# Patient Record
Sex: MALE | Race: ASIAN
[De-identification: names, ages, dates, MRNs, and addresses within clinical notes are randomized per-mention and may not be internally consistent; named-entity substitution may affect disease eponyms.]

---

## 2019-05-31 ENCOUNTER — HOSPITAL ENCOUNTER (INPATIENT)
Dept: HOSPITAL 62 - ER | Age: 29
LOS: 2 days | Discharge: HOME | DRG: 390 | End: 2019-06-02
Attending: SURGERY | Admitting: SURGERY
Payer: SELF-PAY

## 2019-05-31 DIAGNOSIS — Z87.891: ICD-10-CM

## 2019-05-31 DIAGNOSIS — K56.690: Primary | ICD-10-CM

## 2019-05-31 LAB
ADD MANUAL DIFF: NO
ALBUMIN SERPL-MCNC: 5.1 G/DL (ref 3.5–5)
ALP SERPL-CCNC: 86 U/L (ref 38–126)
ALT SERPL-CCNC: 25 U/L (ref 21–72)
ANION GAP SERPL CALC-SCNC: 15 MMOL/L (ref 5–19)
APPEARANCE UR: (no result)
APTT PPP: (no result) S
AST SERPL-CCNC: 22 U/L (ref 17–59)
BASOPHILS # BLD AUTO: 0.1 10^3/UL (ref 0–0.2)
BASOPHILS NFR BLD AUTO: 0.4 % (ref 0–2)
BILIRUB DIRECT SERPL-MCNC: 0.4 MG/DL (ref 0–0.4)
BILIRUB SERPL-MCNC: 0.6 MG/DL (ref 0.2–1.3)
BILIRUB UR QL STRIP: NEGATIVE
BUN SERPL-MCNC: 12 MG/DL (ref 7–20)
CALCIUM: 10.4 MG/DL (ref 8.4–10.2)
CHLORIDE SERPL-SCNC: 99 MMOL/L (ref 98–107)
CO2 SERPL-SCNC: 26 MMOL/L (ref 22–30)
EOSINOPHIL # BLD AUTO: 0 10^3/UL (ref 0–0.6)
EOSINOPHIL NFR BLD AUTO: 0.1 % (ref 0–6)
ERYTHROCYTE [DISTWIDTH] IN BLOOD BY AUTOMATED COUNT: 12.9 % (ref 11.5–14)
GLUCOSE SERPL-MCNC: 121 MG/DL (ref 75–110)
GLUCOSE UR STRIP-MCNC: NEGATIVE MG/DL
HCT VFR BLD CALC: 51.6 % (ref 37.9–51)
HGB BLD-MCNC: 17.9 G/DL (ref 13.5–17)
KETONES UR STRIP-MCNC: (no result) MG/DL
LIPASE SERPL-CCNC: 49.8 U/L (ref 23–300)
LYMPHOCYTES # BLD AUTO: 1.6 10^3/UL (ref 0.5–4.7)
LYMPHOCYTES NFR BLD AUTO: 11.1 % (ref 13–45)
MCH RBC QN AUTO: 32.1 PG (ref 27–33.4)
MCHC RBC AUTO-ENTMCNC: 34.6 G/DL (ref 32–36)
MCV RBC AUTO: 93 FL (ref 80–97)
MONOCYTES # BLD AUTO: 0.8 10^3/UL (ref 0.1–1.4)
MONOCYTES NFR BLD AUTO: 5.4 % (ref 3–13)
NEUTROPHILS # BLD AUTO: 11.7 10^3/UL (ref 1.7–8.2)
NEUTS SEG NFR BLD AUTO: 83 % (ref 42–78)
NITRITE UR QL STRIP: NEGATIVE
PH UR STRIP: 5 [PH] (ref 5–9)
PLATELET # BLD: 378 10^3/UL (ref 150–450)
POTASSIUM SERPL-SCNC: 4.4 MMOL/L (ref 3.6–5)
PROT SERPL-MCNC: 8.1 G/DL (ref 6.3–8.2)
PROT UR STRIP-MCNC: 30 MG/DL
RBC # BLD AUTO: 5.56 10^6/UL (ref 4.35–5.55)
SODIUM SERPL-SCNC: 139.8 MMOL/L (ref 137–145)
SP GR UR STRIP: 1.03
TOTAL CELLS COUNTED % (AUTO): 100 %
UROBILINOGEN UR-MCNC: NEGATIVE MG/DL (ref ?–2)
WBC # BLD AUTO: 14.1 10^3/UL (ref 4–10.5)

## 2019-05-31 PROCEDURE — 74250 X-RAY XM SM INT 1CNTRST STD: CPT

## 2019-05-31 PROCEDURE — 74022 RADEX COMPL AQT ABD SERIES: CPT

## 2019-05-31 PROCEDURE — 96361 HYDRATE IV INFUSION ADD-ON: CPT

## 2019-05-31 PROCEDURE — 80053 COMPREHEN METABOLIC PANEL: CPT

## 2019-05-31 PROCEDURE — 80048 BASIC METABOLIC PNL TOTAL CA: CPT

## 2019-05-31 PROCEDURE — 74177 CT ABD & PELVIS W/CONTRAST: CPT

## 2019-05-31 PROCEDURE — 96375 TX/PRO/DX INJ NEW DRUG ADDON: CPT

## 2019-05-31 PROCEDURE — 74018 RADEX ABDOMEN 1 VIEW: CPT

## 2019-05-31 PROCEDURE — 81001 URINALYSIS AUTO W/SCOPE: CPT

## 2019-05-31 PROCEDURE — 99285 EMERGENCY DEPT VISIT HI MDM: CPT

## 2019-05-31 PROCEDURE — 0D9670Z DRAINAGE OF STOMACH WITH DRAINAGE DEVICE, VIA NATURAL OR ARTIFICIAL OPENING: ICD-10-PCS

## 2019-05-31 PROCEDURE — 85025 COMPLETE CBC W/AUTO DIFF WBC: CPT

## 2019-05-31 PROCEDURE — 36415 COLL VENOUS BLD VENIPUNCTURE: CPT

## 2019-05-31 PROCEDURE — 96374 THER/PROPH/DIAG INJ IV PUSH: CPT

## 2019-05-31 PROCEDURE — 71045 X-RAY EXAM CHEST 1 VIEW: CPT

## 2019-05-31 PROCEDURE — 83605 ASSAY OF LACTIC ACID: CPT

## 2019-05-31 PROCEDURE — 83690 ASSAY OF LIPASE: CPT

## 2019-05-31 RX ADMIN — MORPHINE SULFATE PRN MG: 10 INJECTION INTRAMUSCULAR; INTRAVENOUS; SUBCUTANEOUS at 20:23

## 2019-05-31 RX ADMIN — DEXTROSE, SODIUM CHLORIDE, SODIUM LACTATE, POTASSIUM CHLORIDE, AND CALCIUM CHLORIDE PRN MLS/HR: 5; .6; .31; .03; .02 INJECTION, SOLUTION INTRAVENOUS at 23:12

## 2019-05-31 RX ADMIN — DEXTROSE, SODIUM CHLORIDE, SODIUM LACTATE, POTASSIUM CHLORIDE, AND CALCIUM CHLORIDE PRN MLS/HR: 5; .6; .31; .03; .02 INJECTION, SOLUTION INTRAVENOUS at 16:22

## 2019-05-31 RX ADMIN — MORPHINE SULFATE PRN MG: 10 INJECTION INTRAMUSCULAR; INTRAVENOUS; SUBCUTANEOUS at 23:58

## 2019-05-31 RX ADMIN — MORPHINE SULFATE PRN MG: 10 INJECTION INTRAMUSCULAR; INTRAVENOUS; SUBCUTANEOUS at 16:22

## 2019-05-31 NOTE — RADIOLOGY REPORT (SQ)
EXAM DESCRIPTION:  CT ABD/PELVIS WITH IV   ORAL



COMPLETED DATE/TIME:  5/31/2019 10:10 am



REASON FOR STUDY:  SBO



COMPARISON:  None.



TECHNIQUE:  CT scan of the abdomen and pelvis performed with intravenous and oral contrast using blank
lelo scanning technique with dynamic intravenous contrast injection. Images reviewed with lung, soft t
issue, and bone windows. Reconstructed coronal and sagittal MPR images reviewed. Delayed images for e
valuation of the urinary system also acquired. All images stored on PACS.

All CT scanners at this facility use dose modulation, iterative reconstruction, and/or weight based d
osing when appropriate to reduce radiation dose to as low as reasonably achievable (ALARA).

[facility]: Dose Right  [facility]: CareDose    [facility]: Dose Right    [facility]: Teradose 4D    [facility]: Smart Technologies



CONTRAST TYPE AND DOSE:  contrast/concentration: Isovue 350.00 mg/ml; Total Contrast Delivered: 87.0 
ml; Total Saline Delivered: 69.0 ml



RENAL FUNCTION:  GFR > 60.



RADIATION DOSE:  CT Rad equipment meets quality standard of care and radiation dose reduction techniq
ues were employed. CTDIvol: NaN - NaN mGy. DLP: 0 mGy-cm. .



LIMITATIONS:  None.



FINDINGS:  LOWER CHEST: No significant findings. No nodules or infiltrates.

LIVER: Normal size. No masses.  No dilated ducts.

SPLEEN: Normal size. No focal lesions.

PANCREAS: No masses. No significant calcifications. No adjacent inflammation or peripancreatic fluid 
collections. Pancreatic duct not dilated.

GALLBLADDER: No identified stones by CT criteria. No inflammatory changes to suggest cholecystitis.

ADRENAL GLANDS: No significant masses or asymmetry.

RIGHT KIDNEY AND URETER: No solid masses.   No significant calcifications.   No hydronephrosis or hyd
roureter.

LEFT KIDNEY AND URETER: No solid masses.   No significant calcifications.   No hydronephrosis or hydr
oureter.

AORTA AND VESSELS: See below.

RETROPERITONEUM: No retroperitoneal adenopathy, hemorrhage or masses.

BOWEL AND PERITONEAL CAVITY: Dilated loops of small bowel with gas fluid levels.  No clear transition
 point.  Distal duodenum is not visualized to left of midline.  SMA is to the right of the SMV.  No a
scites or free air.

APPENDIX: Not visualized.

PELVIS: Small amount of free fluid.

ABDOMINAL WALL: No masses. No hernias.

BONES: No significant or acute findings.

OTHER: No other significant finding.



IMPRESSION:  Partial small bowel obstruction associated with intestinal malrotation.



COMMENT:  Findings were called to Dr. Diamond.



TECHNICAL DOCUMENTATION:  JOB ID:  [id number]

Quality ID # [id number]: Final reports with documentation of one or more dose reduction techniques (e.g., Au
tomated exposure control, adjustment of the mA and/or kV according to patient size, use of iterative 
reconstruction technique)

 2011 [employer]- All Rights Reserved



Reading location - IP/workstation name: [email or web address]

## 2019-05-31 NOTE — RADIOLOGY REPORT (SQ)
EXAM DESCRIPTION:  CHEST SINGLE VIEW



COMPLETED DATE/TIME:  5/31/2019 11:22 am



REASON FOR STUDY:  NG tube placement confirmation



COMPARISON:  None.



EXAM PARAMETERS:  NUMBER OF VIEWS: One view.

TECHNIQUE: Single frontal radiographic view of the upper abdomen and lower chest acquired.

RADIATION DOSE: NA

LIMITATIONS: None.



FINDINGS:  Nasogastric tube tip overlies the stomach.



IMPRESSION:  Nasogastric tube in the stomach.



TECHNICAL DOCUMENTATION:  JOB ID:  [id number]

 2011 [employer]- All Rights Reserved



Reading location - IP/workstation name: [email or web address]

## 2019-05-31 NOTE — PDOC H&P
History of Present Illness


Admission Date/PCP: 


  19 10:46





  





Patient complains of: abdominal pains


History of Present Illness: 


GUNJAN KYLE is a 29 year old malewith history of gastroschisis when  c/o

abdomonal pains 2 days ago associated with nausea. Today pains worsened and went

to ED. Had CT scan of abd/pelvis which showed partial SBO. He had a small BM and

flatus this am.. Denies fever/chills. Pains are usually episodic/crampy.








Past Surgical History


Past Surgical History: Reports: Other - surgery for gastroschisis as a  

in the [facility].





Social History


Smoking Status: Former Smoker


Last Time Smoked: 2019


Frequency of Alcohol Use: Occasional


Hx Recreational Drug Use: No


Hx Prescription Drug Abuse: No





- Advance Directive


Resuscitation Status: Full Code





Family History


Family History: Reviewed & Not Pertinent


Parental Family History Reviewed: Yes


Children Family History Reviewed: No


Sibling(s) Family History Reviewed.: No





Medication/Allergy


Home Medications: 








No Home Medications  19 








Allergies/Adverse Reactions: 


                                        





No Known Allergies Allergy (Unverified 19 06:28)


   











Review of Systems


Constitutional: PRESENT: as per HPI


Eyes: PRESENT: other - no visual/hearing changes


Cardiovascular: PRESENT: other - no chest pains/cough


Gastrointestinal: PRESENT: abdominal pain, nausea





Physical Exam


Vital Signs: 


                                        











Temp Pulse Resp BP Pulse Ox


 


 98.0 F   86   15   145/96 H  96 


 


 19 13:18  19 13:18  19 13:18  19 13:18  19 13:18








                                 Intake & Output











 19





 06:59 06:59 06:59


 


Intake Total   3000


 


Balance   3000


 


Weight  76.5 kg 76.4 kg











General appearance: PRESENT: severe distress


Head exam: PRESENT: atraumatic


Eye exam: PRESENT: conjunctiva pink


Mouth exam: PRESENT: moist


Neck exam: PRESENT: full ROM


Respiratory exam: PRESENT: clear to auscultation moriah


Cardiovascular exam: PRESENT: tachycardia


Pulses: PRESENT: normal radial pulses


Vascular exam: PRESENT: normal capillary refill


GI/Abdominal exam: PRESENT: soft, tenderness - epigastric area


Rectal exam: PRESENT: deferred


Extremities exam: PRESENT: full ROM


Musculoskeletal exam: PRESENT: ambulatory


Neurological exam: PRESENT: alert, oriented to person, oriented to place, 

oriented to time, oriented to situation


Psychiatric exam: PRESENT: appropriate affect


Skin exam: PRESENT: normal color, warm





Results


Laboratory Results: 


                                        





                                 19 06:35 





                                 19 06:35 





                                        











  19





  06:35 06:35 06:35


 


WBC  14.1 H  


 


RBC  5.56 H  


 


Hgb  17.9 H  


 


Hct  51.6 H  


 


MCV  93  


 


MCH  32.1  


 


MCHC  34.6  


 


RDW  12.9  


 


Plt Count  378  


 


Seg Neutrophils %  83.0 H  


 


Lymphocytes %  11.1 L  


 


Monocytes %  5.4  


 


Eosinophils %  0.1  


 


Basophils %  0.4  


 


Absolute Neutrophils  11.7 H  


 


Absolute Lymphocytes  1.6  


 


Absolute Monocytes  0.8  


 


Absolute Eosinophils  0.0  


 


Absolute Basophils  0.1  


 


Sodium   139.8 


 


Potassium   4.4 


 


Chloride   99 


 


Carbon Dioxide   26 


 


Anion Gap   15 


 


BUN   12 


 


Creatinine   0.79 


 


Est GFR ( Amer)   > 60 


 


Est GFR (Non-Af Amer)   > 60 


 


Glucose   121 H 


 


Lactic Acid    1.5


 


Calcium   10.4 H 


 


Total Bilirubin   0.6 


 


AST   22 


 


ALT   25 


 


Alkaline Phosphatase   86 


 


Total Protein   8.1 


 


Albumin   5.1 H 


 


Lipase   49.8 


 


Urine Color   


 


Urine Appearance   


 


Urine pH   


 


Ur Specific Gravity   


 


Urine Protein   


 


Urine Glucose (UA)   


 


Urine Ketones   


 


Urine Blood   


 


Urine Nitrite   


 


Ur Leukocyte Esterase   


 


Urine WBC (Auto)   


 


Urine RBC (Auto)   














  19





  07:40


 


WBC 


 


RBC 


 


Hgb 


 


Hct 


 


MCV 


 


MCH 


 


MCHC 


 


RDW 


 


Plt Count 


 


Seg Neutrophils % 


 


Lymphocytes % 


 


Monocytes % 


 


Eosinophils % 


 


Basophils % 


 


Absolute Neutrophils 


 


Absolute Lymphocytes 


 


Absolute Monocytes 


 


Absolute Eosinophils 


 


Absolute Basophils 


 


Sodium 


 


Potassium 


 


Chloride 


 


Carbon Dioxide 


 


Anion Gap 


 


BUN 


 


Creatinine 


 


Est GFR ( Amer) 


 


Est GFR (Non-Af Amer) 


 


Glucose 


 


Lactic Acid 


 


Calcium 


 


Total Bilirubin 


 


AST 


 


ALT 


 


Alkaline Phosphatase 


 


Total Protein 


 


Albumin 


 


Lipase 


 


Urine Color  WILBERTO


 


Urine Appearance  SLIGHTLY-CLOUDY


 


Urine pH  5.0


 


Ur Specific Gravity  1.030


 


Urine Protein  30 H


 


Urine Glucose (UA)  NEGATIVE


 


Urine Ketones  TRACE H


 


Urine Blood  NEGATIVE


 


Urine Nitrite  NEGATIVE


 


Ur Leukocyte Esterase  NEGATIVE


 


Urine WBC (Auto)  1


 


Urine RBC (Auto)  2











Impressions: 


                                        





Chest X-Ray  19 00:00


IMPRESSION:  Nasogastric tube in the stomach.


 








Acute Abdomen Series  19 06:29


IMPRESSION:


 


1.  No evidence of acute intrathoracic disease..


2.  Findings suspicious for a mid to distal small bowel


obstruction.


3. Suspect possible hepatomegaly.


 








Abdomen/Pelvis CT  19 07:23


IMPRESSION:  Partial small bowel obstruction associated with intestinal 

malrotation.


 














Assessment & Plan





- Diagnosis








(2) Abdominal pain


Qualifiers: 


   Abdominal location: generalized   Qualified Code(s): R10.84 - Generalized 

abdominal pain   


Is this a current diagnosis for this admission?: Yes   





(3) Congenital malrotation of intestine


Is this a current diagnosis for this admission?: Yes   





(4) Partial small bowel obstruction


Is this a current diagnosis for this admission?: Yes   





- Time


Time Spent: 30 to 50 Minutes





- Inpatient Certification


Medical Necessity: Need For IV Fluids, Need for Pain Control, Need for Surgery, 

Risk of Complication if Not Cared For in Hospital





- Plan Summary


Plan Summary: 





Just ordered SBFT to see area of transition and/or relieve partial obstruction


Keep NGT


Re-evaluate post SBFT

## 2019-05-31 NOTE — RADIOLOGY REPORT (SQ)
EXAM:



X-ray abdomen supine and erect with chest



CLINICAL DATA:



29-year-old male with abdominal pain



TECHNICAL DATA: 



Three x-ray images of the chest and abdomen were performed

including a PA radiograph of the chest as well as supine and

upright views of the abdomen. This study was performed on

5/31/2019 at 6:49 AM.



Comparison:  None.



FINDINGS:



The lungs are well expanded. The cardiac silhouette is within

normal limits. There is no focal consolidation, pleural effusion

or pneumothorax. The costophrenic sulci are clear.



There are multiple borderline dilated air-filled loops of small

bowel demonstrating differential air-fluid levels on the upright

projection. There is no evidence of free intraperitoneal air.

There does appear to be air and fecal residue in the region of

the rectum. Overall, there is a paucity of air in the colon.

Overall findings are concerning for a mid to distal small bowel

obstruction. No pathologic abdominal or pelvic calcifications are

identified. The psoas margins are preserved. There appears to be

elongation of the liver suggesting possible hepatomegaly. No

acute osseous abnormalities are identified.



IMPRESSION:



1.  No evidence of acute intrathoracic disease..

2.  Findings suspicious for a mid to distal small bowel

obstruction.

3. Suspect possible hepatomegaly.

## 2019-05-31 NOTE — ER DOCUMENT REPORT
Entered by MICHAEL SCHMID SCRIBE  05/31/19 0636 





Acting as scribe for:ARMANI HAYNES MD





ED General





- General


Chief Complaint: Abdominal Pain


Stated Complaint: STOMACH PAIN


Time Seen by Provider: 05/31/19 06:11


Mode of Arrival: Ambulatory


Information source: Patient


Notes: 


Patient is a  29 year old male with a history of gastroschisis repair presents 

to the emergency department complaining of abdominal pain and nausea onset 2 

days ago. Patient states the pain is located around is old surgical site and 

describes the pain as "extreme" that is exacerbated with coughing and walking. 

He states he has not eaten in the last 24 hours. He also reports a normal, small

bowel movement before arrival to the emergency department. He denies any 

vomiting or fevers.   


TRAVEL OUTSIDE OF THE [place] IN LAST 30 DAYS: No





- Related Data


Allergies/Adverse Reactions: 


                                        





No Known Allergies Allergy (Unverified 05/31/19 06:28)


   











Past Medical History





- General


Information source: Patient





- Social History


Smoking Status: Current Some Day Smoker


Cigarette use (# per day): Yes


Chew tobacco use (# tins/day): No


Frequency of alcohol use: None


Drug Abuse: None


Occupation: Cellphone technician


Family History: Reviewed & Not Pertinent


Patient has suicidal ideation: No


Patient has homicidal ideation: No


Past Surgical History: Reports: Hx Abdominal Surgery - gastroschisis repair as 

infant





Review of Systems





- Review of Systems


Constitutional: No symptoms reported


EENT: No symptoms reported


Cardiovascular: No symptoms reported


Respiratory: No symptoms reported


Gastrointestinal: See HPI, Abdominal pain, Nausea


Genitourinary: No symptoms reported


Male Genitourinary: No symptoms reported


Musculoskeletal: No symptoms reported


Skin: No symptoms reported


Hematologic/Lymphatic: No symptoms reported


Neurological/Psychological: No symptoms reported


-: Yes All other systems reviewed and negative





Physical Exam





- Vital signs


Vitals: 


                                        











Temp Pulse Resp BP Pulse Ox


 


 98.5 F   106 H  20   146/86 H  95 


 


 05/31/19 03:00  05/31/19 03:00  05/31/19 03:00  05/31/19 03:00  05/31/19 03:00














- Notes


Notes: 





 


GENERAL: Alert, interacts well. No acute distress.


 


HEAD: Normocephalic, atraumatic.


 


EYES: Pupils equal, round, and reactive to light. Extraocular movements intact.


 


ENT: Oral mucosa moist, tongue midline. 


 


NECK: Full range of motion. Supple. Trachea midline.


 


LUNGS: Clear to auscultation bilaterally, no wheezes, rales, or rhonchi. No 

respiratory distress.


 


HEART: Regular rate and rhythm. No murmurs, gallops, or rubs.


 


ABDOMEN: Soft. Diffusely tender to palpation, worse in the RLQ with rebounding. 

Non-distended. Markedly decreased bowel sounds.  Resonant to percussion. No 

guarding or rigidity. 


 


EXTREMITIES: Moves all 4 extremities spontaneously.


 


NEUROLOGICAL: Alert and oriented x3. Normal speech. 


 


PSYCH: Normal affect, normal mood.


 


SKIN: Warm, dry, normal turgor. No rashes or lesions noted.





Course





- Vital Signs


Vital signs: 


                                        











Temp Pulse Resp BP Pulse Ox


 


 98.5 F   106 H  18   133/98 H  97 


 


 05/31/19 03:00  05/31/19 03:00  05/31/19 09:27  05/31/19 08:01  05/31/19 09:25














- Laboratory


Result Diagrams: 


                                 05/31/19 06:35





                                 05/31/19 06:35


Laboratory results interpreted by me: 


                                        











  05/31/19 05/31/19 05/31/19





  06:35 06:35 07:40


 


WBC  14.1 H  


 


RBC  5.56 H  


 


Hgb  17.9 H  


 


Hct  51.6 H  


 


Seg Neutrophils %  83.0 H  


 


Lymphocytes %  11.1 L  


 


Absolute Neutrophils  11.7 H  


 


Glucose   121 H 


 


Calcium   10.4 H 


 


Albumin   5.1 H 


 


Urine Protein    30 H


 


Urine Ketones    TRACE H














- Diagnostic Test


Radiology reviewed: Image reviewed, Reports reviewed - Acute abdominal series 

showed multiple air-fluid levels suggesting possible small bowel obstruction. CT

scan of the abdomen pelvis with oral and IV contrast shows partial small bowel 

obstruction and malrotation of the small intestine.





- Consults


  ** Dr. Davis


Time consulted: 10:20


Consulted provider: will come to ER





Discharge





- Discharge


Clinical Impression: 


 Partial small bowel obstruction, Congenital malrotation of intestine





Abdominal pain


Qualifiers:


 Abdominal location: generalized Qualified Code(s): R10.84 - Generalized 

abdominal pain





Condition: Stable


Disposition: ADMITTED AS INPATIENT


Admitting Provider: Surgicalist


Unit Admitted: Surgical Floor


Scribe Attestation: 





05/31/19 06:42


I personally performed the services described in the documentation, reviewed and

edited the documentation which was dictated to the scribe in my presence, and it

accurately records my words and actions.





I personally performed the services described in the documentation, reviewed and

edited the documentation which was dictated to the scribe in my presence, and it

accurately records my words and actions.

## 2019-06-01 LAB
ADD MANUAL DIFF: NO
ALBUMIN SERPL-MCNC: 4.1 G/DL (ref 3.5–5)
ALP SERPL-CCNC: 59 U/L (ref 38–126)
ALT SERPL-CCNC: 21 U/L (ref 21–72)
ANION GAP SERPL CALC-SCNC: 10 MMOL/L (ref 5–19)
AST SERPL-CCNC: 13 U/L (ref 17–59)
BASOPHILS # BLD AUTO: 0 10^3/UL (ref 0–0.2)
BASOPHILS NFR BLD AUTO: 0.4 % (ref 0–2)
BILIRUB DIRECT SERPL-MCNC: 0.2 MG/DL (ref 0–0.4)
BILIRUB SERPL-MCNC: 0.6 MG/DL (ref 0.2–1.3)
BUN SERPL-MCNC: 9 MG/DL (ref 7–20)
CALCIUM: 9.6 MG/DL (ref 8.4–10.2)
CHLORIDE SERPL-SCNC: 101 MMOL/L (ref 98–107)
CO2 SERPL-SCNC: 30 MMOL/L (ref 22–30)
EOSINOPHIL # BLD AUTO: 0 10^3/UL (ref 0–0.6)
EOSINOPHIL NFR BLD AUTO: 0.3 % (ref 0–6)
ERYTHROCYTE [DISTWIDTH] IN BLOOD BY AUTOMATED COUNT: 12.9 % (ref 11.5–14)
GLUCOSE SERPL-MCNC: 121 MG/DL (ref 75–110)
HCT VFR BLD CALC: 47.2 % (ref 37.9–51)
HGB BLD-MCNC: 16.2 G/DL (ref 13.5–17)
LYMPHOCYTES # BLD AUTO: 1.8 10^3/UL (ref 0.5–4.7)
LYMPHOCYTES NFR BLD AUTO: 17.3 % (ref 13–45)
MCH RBC QN AUTO: 32 PG (ref 27–33.4)
MCHC RBC AUTO-ENTMCNC: 34.3 G/DL (ref 32–36)
MCV RBC AUTO: 93 FL (ref 80–97)
MONOCYTES # BLD AUTO: 1.3 10^3/UL (ref 0.1–1.4)
MONOCYTES NFR BLD AUTO: 12.2 % (ref 3–13)
NEUTROPHILS # BLD AUTO: 7.3 10^3/UL (ref 1.7–8.2)
NEUTS SEG NFR BLD AUTO: 69.8 % (ref 42–78)
PLATELET # BLD: 331 10^3/UL (ref 150–450)
POTASSIUM SERPL-SCNC: 3.8 MMOL/L (ref 3.6–5)
PROT SERPL-MCNC: 6.3 G/DL (ref 6.3–8.2)
RBC # BLD AUTO: 5.06 10^6/UL (ref 4.35–5.55)
SODIUM SERPL-SCNC: 141.2 MMOL/L (ref 137–145)
TOTAL CELLS COUNTED % (AUTO): 100 %
WBC # BLD AUTO: 10.5 10^3/UL (ref 4–10.5)

## 2019-06-01 RX ADMIN — MORPHINE SULFATE PRN MG: 10 INJECTION INTRAMUSCULAR; INTRAVENOUS; SUBCUTANEOUS at 04:03

## 2019-06-01 RX ADMIN — DEXTROSE, SODIUM CHLORIDE, SODIUM LACTATE, POTASSIUM CHLORIDE, AND CALCIUM CHLORIDE PRN MLS/HR: 5; .6; .31; .03; .02 INJECTION, SOLUTION INTRAVENOUS at 06:17

## 2019-06-01 RX ADMIN — DEXTROSE, SODIUM CHLORIDE, SODIUM LACTATE, POTASSIUM CHLORIDE, AND CALCIUM CHLORIDE PRN MLS/HR: 5; .6; .31; .03; .02 INJECTION, SOLUTION INTRAVENOUS at 14:21

## 2019-06-01 NOTE — PDOC PROGRESS REPORT
Subjective


Progress Note for:: 06/01/19


Subjective:: 





feeling better after NGT hooked back to suction at midnight


Feels like he is going to have a BM


Did not take pain meds since 4 am


Feels thirsty


Reason For Visit: 


PARTIAL SMALL BOWEL OBSTRUCTION,HISTORY OF








Physical Exam


Vital Signs: 


                                        











Temp Pulse Resp BP Pulse Ox


 


 98.3 F   92   16   128/88 H  97 


 


 06/01/19 07:41  06/01/19 07:41  06/01/19 07:41  06/01/19 07:41  06/01/19 07:41








                                 Intake & Output











 05/31/19 06/01/19 06/02/19





 06:59 06:59 06:59


 


Intake Total  3000 


 


Output Total  2900 


 


Balance  100 


 


Weight 76.5 kg 76.4 kg 











Exam: 





abd is less distended and soft. Mild tenderness RLQ


NGT in place





Results


Laboratory Results: 


                                        





                                 06/01/19 04:12 





                                 06/01/19 04:12 





                                        











  06/01/19 06/01/19





  04:12 04:12


 


WBC  10.5 


 


RBC  5.06 


 


Hgb  16.2 


 


Hct  47.2 


 


MCV  93 


 


MCH  32.0 


 


MCHC  34.3 


 


RDW  12.9 


 


Plt Count  331 


 


Seg Neutrophils %  69.8 


 


Lymphocytes %  17.3 


 


Monocytes %  12.2 


 


Eosinophils %  0.3 


 


Basophils %  0.4 


 


Absolute Neutrophils  7.3 


 


Absolute Lymphocytes  1.8 


 


Absolute Monocytes  1.3 


 


Absolute Eosinophils  0.0 


 


Absolute Basophils  0.0 


 


Sodium   141.2


 


Potassium   3.8


 


Chloride   101


 


Carbon Dioxide   30


 


Anion Gap   10


 


BUN   9


 


Creatinine   0.77


 


Est GFR ( Amer)   > 60


 


Est GFR (Non-Af Amer)   > 60


 


Glucose   121 H


 


Calcium   9.6


 


Total Bilirubin   0.6


 


AST   13 L


 


ALT   21


 


Alkaline Phosphatase   59


 


Total Protein   6.3


 


Albumin   4.1











Impressions: 


                                        





Chest X-Ray  05/31/19 00:00


IMPRESSION:  Nasogastric tube in the stomach.


 








Acute Abdomen Series  05/31/19 06:29


IMPRESSION:


 


1.  No evidence of acute intrathoracic disease..


2.  Findings suspicious for a mid to distal small bowel


obstruction.


3. Suspect possible hepatomegaly.


 








Abdomen/Pelvis CT  05/31/19 07:23


IMPRESSION:  Partial small bowel obstruction associated with intestinal 

malrotation.


 








KUB X-Ray  06/01/19 00:00


IMPRESSION:


 


Persistent oral contrast present throughout multiple distended


and mildly dilated small bowel loops concerning for at least a


partial small bowel obstruction.


 














Assessment & Plan





- Diagnosis








(2) Abdominal pain


Qualifiers: 


   Abdominal location: generalized   Qualified Code(s): R10.84 - Generalized 

abdominal pain   


Is this a current diagnosis for this admission?: Yes   





(3) Congenital malrotation of intestine


Is this a current diagnosis for this admission?: Yes   





(4) Partial small bowel obstruction


Is this a current diagnosis for this admission?: Yes   





- Time


Time Spent with patient: 15-24 minutes





- Inpatient Certification


Medical Necessity: Need Close Monitoring Due to Risk of Patient Decompensation, 

Need For IV Fluids, Need for Pain Control, Risk of Complication if Not Cared For

in Hospital





- Plan Summary


Plan Summary: 





A/


KUB this am showed gastrograffin in small bowel but not yet in the colon


WBC normal





P/


Continue NGT for now


Increase IVF today


Expect SBO to resolve but mother told me that patient had another operation for 

bowel obstruction at age 13. So, we'll watch closely.

## 2019-06-01 NOTE — RADIOLOGY REPORT (SQ)
EXAM DESCRIPTION: 



X-ray abdomen 1 view



CLINICAL DATA:



29-year-old male with small bowel obstruction.



TECHNICAL DATA: 



A single AP supine x-ray of the abdomen was performed on 6/1/2019

at 7:05 AM.



Comparison:  Prior upper GI study performed on 5/31/2019.



FINDINGS:



There is persistent oral contrast present throughout multiple

small bowel loops which are mildly distended and borderline

dilated. No significant contrast is appreciated in the colon. The

bowel gas pattern is nonspecific but raises concern for a partial

small bowel obstruction. There is contrast present within the

urinary bladder.



No pathologic abdominal or pelvic calcifications are identified. 



No focal soft tissue abnormalities are seen.



No acute osseous abnormalities are identified.



IMPRESSION:



Persistent oral contrast present throughout multiple distended

and mildly dilated small bowel loops concerning for at least a

partial small bowel obstruction.

## 2019-06-02 VITALS — SYSTOLIC BLOOD PRESSURE: 119 MMHG | DIASTOLIC BLOOD PRESSURE: 71 MMHG

## 2019-06-02 LAB
ADD MANUAL DIFF: NO
ANION GAP SERPL CALC-SCNC: 5 MMOL/L (ref 5–19)
BASOPHILS # BLD AUTO: 0 10^3/UL (ref 0–0.2)
BASOPHILS NFR BLD AUTO: 0.6 % (ref 0–2)
BUN SERPL-MCNC: 9 MG/DL (ref 7–20)
CALCIUM: 8.8 MG/DL (ref 8.4–10.2)
CHLORIDE SERPL-SCNC: 103 MMOL/L (ref 98–107)
CO2 SERPL-SCNC: 31 MMOL/L (ref 22–30)
EOSINOPHIL # BLD AUTO: 0.1 10^3/UL (ref 0–0.6)
EOSINOPHIL NFR BLD AUTO: 0.9 % (ref 0–6)
ERYTHROCYTE [DISTWIDTH] IN BLOOD BY AUTOMATED COUNT: 12.7 % (ref 11.5–14)
GLUCOSE SERPL-MCNC: 96 MG/DL (ref 75–110)
HCT VFR BLD CALC: 39.9 % (ref 37.9–51)
HGB BLD-MCNC: 13.6 G/DL (ref 13.5–17)
LYMPHOCYTES # BLD AUTO: 1.9 10^3/UL (ref 0.5–4.7)
LYMPHOCYTES NFR BLD AUTO: 25.2 % (ref 13–45)
MCH RBC QN AUTO: 31.9 PG (ref 27–33.4)
MCHC RBC AUTO-ENTMCNC: 34 G/DL (ref 32–36)
MCV RBC AUTO: 94 FL (ref 80–97)
MONOCYTES # BLD AUTO: 0.8 10^3/UL (ref 0.1–1.4)
MONOCYTES NFR BLD AUTO: 10.7 % (ref 3–13)
NEUTROPHILS # BLD AUTO: 4.8 10^3/UL (ref 1.7–8.2)
NEUTS SEG NFR BLD AUTO: 62.6 % (ref 42–78)
PLATELET # BLD: 260 10^3/UL (ref 150–450)
POTASSIUM SERPL-SCNC: 3.3 MMOL/L (ref 3.6–5)
RBC # BLD AUTO: 4.26 10^6/UL (ref 4.35–5.55)
SODIUM SERPL-SCNC: 138.9 MMOL/L (ref 137–145)
TOTAL CELLS COUNTED % (AUTO): 100 %
WBC # BLD AUTO: 7.7 10^3/UL (ref 4–10.5)

## 2019-06-02 RX ADMIN — DEXTROSE, SODIUM CHLORIDE, SODIUM LACTATE, POTASSIUM CHLORIDE, AND CALCIUM CHLORIDE PRN MLS/HR: 5; .6; .31; .03; .02 INJECTION, SOLUTION INTRAVENOUS at 05:29

## 2019-06-02 NOTE — PDOC PROGRESS REPORT
Subjective


Progress Note for:: 06/02/19


Subjective:: 





comfortable; BM today x 2


Reason For Visit: 


PARTIAL SMALL BOWEL OBSTRUCTION,HISTORY OF








Physical Exam


Vital Signs: 


                                        











Temp Pulse Resp BP Pulse Ox


 


 98.8 F   74   17   119/71   98 


 


 06/01/19 23:28  06/01/19 23:28  06/01/19 23:28  06/01/19 23:28  06/01/19 23:28








                                 Intake & Output











 06/01/19 06/02/19 06/03/19





 06:59 06:59 06:59


 


Intake Total 3000 100 


 


Output Total 2900 650 


 


Balance 100 -550 


 


Weight 76.4 kg 77.9 kg 











General appearance: PRESENT: no acute distress, well-developed


GI/Abdominal exam: PRESENT: normal bowel sounds, soft





Results


Laboratory Results: 


                                        





                                 06/02/19 03:50 





                                 06/02/19 03:50 





                                        











  06/02/19 06/02/19





  03:50 03:50


 


WBC  7.7 


 


RBC  4.26 L 


 


Hgb  13.6  D 


 


Hct  39.9 


 


MCV  94 


 


MCH  31.9 


 


MCHC  34.0 


 


RDW  12.7 


 


Plt Count  260 


 


Seg Neutrophils %  62.6 


 


Lymphocytes %  25.2 


 


Monocytes %  10.7 


 


Eosinophils %  0.9 


 


Basophils %  0.6 


 


Absolute Neutrophils  4.8 


 


Absolute Lymphocytes  1.9 


 


Absolute Monocytes  0.8 


 


Absolute Eosinophils  0.1 


 


Absolute Basophils  0.0 


 


Sodium   138.9


 


Potassium   3.3 L


 


Chloride   103


 


Carbon Dioxide   31 H


 


Anion Gap   5


 


BUN   9


 


Creatinine   0.68


 


Est GFR ( Amer)   > 60


 


Est GFR (Non-Af Amer)   > 60


 


Glucose   96


 


Calcium   8.8











Impressions: 


                                        





Chest X-Ray  05/31/19 00:00


IMPRESSION:  Nasogastric tube in the stomach.


 








Small Bowel X-Ray  05/31/19 00:00


IMPRESSION: 


 


Nonvisualization of the colon by 7 hours compatible with a small


bowel obstruction. Recent CT demonstrated findings compatible


with a small bowel obstruction.


 








Acute Abdomen Series  05/31/19 06:29


IMPRESSION:


 


1.  No evidence of acute intrathoracic disease..


2.  Findings suspicious for a mid to distal small bowel


obstruction.


3. Suspect possible hepatomegaly.


 








Abdomen/Pelvis CT  05/31/19 07:23


IMPRESSION:  Partial small bowel obstruction associated with intestinal 

malrotation.


 








KUB X-Ray  06/01/19 00:00


IMPRESSION:


 


Persistent oral contrast present throughout multiple distended


and mildly dilated small bowel loops concerning for at least a


partial small bowel obstruction.


 














Assessment & Plan





- Diagnosis


(1) Partial small bowel obstruction


Is this a current diagnosis for this admission?: Yes   





- Plan Summary


Plan Summary: 





A/





Resolved partial mechanical SBO


BM x2 today


On clear liquid diet








P/





Home today


Full liquid diet at home


Advance to low fiber diet, lifetime at home


Can have cooked veggies (grilled, steamed, or microwaved)


F/u in clinic in 2 weeks

## 2019-06-02 NOTE — DISCHARGE SUMMARY E
Discharge Summary



NAME: GUNJAN KYLE

MRN:  [id number]        : 1990     AGE: 29Y

ADMITTED: 2019                  DISCHARGED: 2019



FINAL DIAGNOSES:

1.   PARTIAL MECHANICAL SMALL BOWEL OBSTRUCTION.

2.   STATUS POST REPAIR OF GASTROSCHISIS.

3.   STATUS POST EXPLORATORY LAPAROTOMY at age 13.



COMPLICATIONS:

None.



PROCEDURES:

None.



HOSPITAL COURSE:

This is a healthy 29-year-old male with a history of gastroschisis

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  He had a mechanical obstruction of the small bowel at

age 13 and underwent a laparotomy at that time. He presented to the 

hospital on 2019,

complaining of abdominal distention, nausea, vomiting, and obstipation. 

He was kept NPO on IV fluids.  CT scan was done with IV and oral contrast,

and on 2019 in the evening and 2019 in the morning, the

patient had multiple bowel movements with normal stools, decompressed

abdomen.  No nausea or vomiting.  His diet was started with clear liquids,

then advanced to full liquid diet.



DISCHARGE ORDERS:

The patient was discharged to home on 2019.  He was discharged home

on a full liquid diet with recommendations to advance to low fiber diet at

home.  Follow up in the office in two weeks.  The patient is recommended

to avoid fiber, or to cook the vegetables as steamed, boiled, grilled, or

microwaved.





DICTATING PHYSICIAN:  ISAURA PETERSON M.D.





[id number]                  DT: 2019    [id number]

PHY#: [id number]            DD: [id number]

ID:   [id number]           JOB#: [id number]       ACCT: [id number]



cc:CANDACE SANTIAGO M.D.

>







MTDD

## 2019-06-02 NOTE — RADIOLOGY REPORT (SQ)
EXAM DESCRIPTION: 



Small bowel follow through, XR



CLINICAL HISTORY: 



SBO 



COMPARISON: 



None.

 

FINDINGS: 



 view demonstrated contrast within the bladder. There are

dilated small bowel loops within the right mid abdomen with

prominent mucosal folds. NG tube courses subdiaphragmatically.

Images were obtained immediately after administration of barium

contrast, 15 minutes, 30 minutes, 45 minutes, 1 hour and 45

minutes, 4 hours, 7hours. At 45 minutes, the majority of the

contrast is still located within the stomach with minimal

opacification of proximal duodenum. Image obtained at 1 hour 45

minutes demonstrates contrast within small bowel loops in the mid

and inferior abdomen. Small bowel loops are dilated with

prominent of the mucosal folds. No contrast is noted within the

colon. At 7 hours similar findings were noted. 



IMPRESSION: 



Nonvisualization of the colon by 7 hours compatible with a small

bowel obstruction. Recent CT demonstrated findings compatible

with a small bowel obstruction.